# Patient Record
Sex: FEMALE | Race: WHITE | URBAN - METROPOLITAN AREA
[De-identification: names, ages, dates, MRNs, and addresses within clinical notes are randomized per-mention and may not be internally consistent; named-entity substitution may affect disease eponyms.]

---

## 2018-01-01 ENCOUNTER — EMERGENCY (EMERGENCY)
Facility: HOSPITAL | Age: 63
LOS: 0 days | End: 2018-07-15
Attending: EMERGENCY MEDICINE | Admitting: EMERGENCY MEDICINE
Payer: MEDICARE

## 2018-01-01 VITALS — TEMPERATURE: 98 F

## 2018-01-01 DIAGNOSIS — I46.9 CARDIAC ARREST, CAUSE UNSPECIFIED: ICD-10-CM

## 2018-01-01 PROCEDURE — 99291 CRITICAL CARE FIRST HOUR: CPT

## 2018-07-15 NOTE — ED PROVIDER NOTE - RESPIRATORY, MLM
no spontaneous breathing. b/l breath sounds while being aerated through Ambu bag no spontaneous breathing. b/l breath sounds while being aerated via Ambu bag

## 2018-07-15 NOTE — ED PROVIDER NOTE - OBJECTIVE STATEMENT
61 y/o female with a PMHx of cirrhosis presents to the ED unresponsive o blood started pumping through tube upon arrival. Pt was found slumped over in a chair in her front porch. Last seen alive 2 hours prior. 6 epi given by EMS PTA at about 6:20pm. CPR for about 20 minutes PTA. No pulse found. Was given fluid and calcium, bgm-131.  factor 20 minutes down time was given. Cardiac activity monitored on sonogram. 6:39 pm pt was pronounced dead. 63 y/o female with a PMHx of cirrhosis presents to the ED unresponsive by EMS, receiving resuscitation, intubated. Pt was found slumped over in a chair in her front porch and EMS called. Last seen alive 2 hours prior. 6 epi given by EMS PTA at about 6:20pm. CPR for about 20 minutes PTA. No pulse found and no shockable rhythm by EMS. As per EMS not hypoglycemia. No other known parameters pertaining to patient's presentation. Family not initially present, as per EMS, on their way.

## 2018-07-15 NOTE — ED PROVIDER NOTE - ENMT, MLM
Nasal mucosa clear. Mouth with normal mucosa. Throat has no vesicles, no oropharyngeal exudates and uvula is midline. Nasal mucosa clear. Mouth with normal mucosa and mild amount of blood. Throat has no vesicles, no oropharyngeal exudates.

## 2018-07-15 NOTE — ED PROVIDER NOTE - MEDICAL DECISION MAKING DETAILS
[facility] KEMP: Cardiac arrest, unclear circumstances, unclear downtime, last seen alive 2 hour prior to coming to the ED, no shockable rhythm, resuscitation in ED unable to bring cardia activity, sonogram bedside showed cardiac stand still, patient pronounced dead at 6:39 pm.

## 2018-07-15 NOTE — ED PROVIDER NOTE - DIAGNOSIS COUNSELING, MDM
conducted a detailed discussion... Spoke with family after patient was pronounced and informed them of the course of treatment, the timing and the time of death. Asked about family's wishes about autopsy (family initially deferred until more family able to convene). RN discussed case with ME. ME ultimately decided not to take case.

## 2018-07-15 NOTE — ED ADULT NURSE NOTE - OBJECTIVE STATEMENT
pt presents to ED at 1826. found unresponsive on chair outside of house. last seen alive 2 hours prior. CPR in progress on arrival. see code flow sheet.

## 2018-07-15 NOTE — ED PROVIDER NOTE - CRITICAL CARE PROVIDED
direct patient care (not related to procedure)/interpretation of diagnostic studies/documentation/additional history taking/consult w/ pt's family directly relating to pts condition

## 2018-07-15 NOTE — ED ADULT NURSE REASSESSMENT NOTE - NS ED NURSE REASSESS COMMENT FT1
Medical examiner notified at 1925. [employer] notified at 1918 (ref #[id number]). pts sister at bedside, refusing pastoral care at this time. pts sister unsure of pts PMD.
medical examiner was able to find pts PMD, Dr. Perdue, he is aware of death of pt.

## 2018-07-15 NOTE — ED PROVIDER NOTE - NS_ ATTENDINGSCRIBEDETAILS _ED_A_ED_FT
I Antonio Thurman MD saw and examined the patient. Scribe documented for me and under my supervision. I have modified the scribe's documentation where necessary to reflect my history, physical exam and other relevant documentations pertinent to the care of the patient.